# Patient Record
Sex: FEMALE | Race: WHITE
[De-identification: names, ages, dates, MRNs, and addresses within clinical notes are randomized per-mention and may not be internally consistent; named-entity substitution may affect disease eponyms.]

---

## 2018-12-06 ENCOUNTER — HOSPITAL ENCOUNTER (OUTPATIENT)
Dept: HOSPITAL 96 - M.RAD | Age: 63
End: 2018-12-06
Attending: GENERAL PRACTICE
Payer: OTHER GOVERNMENT

## 2018-12-06 DIAGNOSIS — Z12.31: Primary | ICD-10-CM

## 2019-02-05 ENCOUNTER — HOSPITAL ENCOUNTER (OUTPATIENT)
Dept: HOSPITAL 96 - M.ULTRA | Age: 64
End: 2019-02-05
Attending: GENERAL PRACTICE
Payer: OTHER GOVERNMENT

## 2019-02-05 DIAGNOSIS — R41.3: ICD-10-CM

## 2019-02-05 DIAGNOSIS — G98.8: Primary | ICD-10-CM

## 2020-07-29 ENCOUNTER — HOSPITAL ENCOUNTER (EMERGENCY)
Dept: HOSPITAL 96 - M.ERS | Age: 65
Discharge: HOME | End: 2020-07-29
Payer: OTHER GOVERNMENT

## 2020-07-29 VITALS — DIASTOLIC BLOOD PRESSURE: 49 MMHG | SYSTOLIC BLOOD PRESSURE: 88 MMHG

## 2020-07-29 VITALS — BODY MASS INDEX: 27.32 KG/M2 | WEIGHT: 160.01 LBS | HEIGHT: 64 IN

## 2020-07-29 DIAGNOSIS — Z87.891: ICD-10-CM

## 2020-07-29 DIAGNOSIS — M79.661: ICD-10-CM

## 2020-07-29 DIAGNOSIS — K21.9: ICD-10-CM

## 2020-07-29 DIAGNOSIS — Z88.8: ICD-10-CM

## 2020-07-29 DIAGNOSIS — G89.18: Primary | ICD-10-CM

## 2020-07-29 DIAGNOSIS — M79.89: ICD-10-CM

## 2020-07-29 DIAGNOSIS — Z90.710: ICD-10-CM

## 2021-04-26 ENCOUNTER — HOSPITAL ENCOUNTER (EMERGENCY)
Dept: HOSPITAL 96 - M.ERS | Age: 66
Discharge: HOME | End: 2021-04-26
Payer: OTHER GOVERNMENT

## 2021-04-26 VITALS — DIASTOLIC BLOOD PRESSURE: 68 MMHG | SYSTOLIC BLOOD PRESSURE: 120 MMHG

## 2021-04-26 VITALS — HEIGHT: 61 IN | WEIGHT: 160.01 LBS | BODY MASS INDEX: 30.21 KG/M2

## 2021-04-26 DIAGNOSIS — Z87.891: ICD-10-CM

## 2021-04-26 DIAGNOSIS — Z88.8: ICD-10-CM

## 2021-04-26 DIAGNOSIS — R22.0: Primary | ICD-10-CM

## 2021-04-26 DIAGNOSIS — Z90.710: ICD-10-CM

## 2021-04-26 DIAGNOSIS — T78.40XA: ICD-10-CM

## 2021-04-26 DIAGNOSIS — K21.9: ICD-10-CM
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
## 2021-05-07 ENCOUNTER — HOSPITAL ENCOUNTER (EMERGENCY)
Dept: HOSPITAL 96 - M.ERS | Age: 66
Discharge: HOME | End: 2021-05-07
Payer: OTHER GOVERNMENT

## 2021-05-07 VITALS — HEIGHT: 61 IN | BODY MASS INDEX: 30.21 KG/M2 | WEIGHT: 160.01 LBS

## 2021-05-07 VITALS — SYSTOLIC BLOOD PRESSURE: 124 MMHG | DIASTOLIC BLOOD PRESSURE: 65 MMHG

## 2021-05-07 DIAGNOSIS — Z87.891: ICD-10-CM

## 2021-05-07 DIAGNOSIS — Z90.710: ICD-10-CM

## 2021-05-07 DIAGNOSIS — K21.9: ICD-10-CM

## 2021-05-07 DIAGNOSIS — Z88.8: ICD-10-CM

## 2021-05-07 DIAGNOSIS — R20.8: ICD-10-CM

## 2021-05-07 DIAGNOSIS — R53.83: Primary | ICD-10-CM

## 2021-05-07 LAB
ABSOLUTE BASOPHILS: 0.1 THOU/UL (ref 0–0.2)
ABSOLUTE EOSINOPHILS: 0.1 THOU/UL (ref 0–0.7)
ABSOLUTE MONOCYTES: 0.7 THOU/UL (ref 0–1.2)
ALBUMIN SERPL-MCNC: 3.8 G/DL (ref 3.4–5)
ALP SERPL-CCNC: 54 U/L (ref 46–116)
ALT SERPL-CCNC: 40 U/L (ref 30–65)
ANION GAP SERPL CALC-SCNC: 11 MMOL/L (ref 7–16)
AST SERPL-CCNC: 22 U/L (ref 15–37)
BASOPHILS NFR BLD AUTO: 1.2 %
BILIRUB SERPL-MCNC: 0.3 MG/DL
BILIRUB UR-MCNC: NEGATIVE MG/DL
BUN SERPL-MCNC: 12 MG/DL (ref 7–18)
CALCIUM SERPL-MCNC: 8.8 MG/DL (ref 8.5–10.1)
CHLORIDE SERPL-SCNC: 103 MMOL/L (ref 98–107)
CO2 SERPL-SCNC: 23 MMOL/L (ref 21–32)
COLOR UR: YELLOW
CREAT SERPL-MCNC: 0.7 MG/DL (ref 0.6–1.3)
EOSINOPHIL NFR BLD: 1.8 %
GLUCOSE SERPL-MCNC: 86 MG/DL (ref 70–99)
GRANULOCYTES NFR BLD MANUAL: 64.1 %
HCT VFR BLD CALC: 39.4 % (ref 37–47)
HGB BLD-MCNC: 13.3 GM/DL (ref 12–15)
KETONES UR STRIP-MCNC: NEGATIVE MG/DL
LYMPHOCYTES # BLD: 1.5 THOU/UL (ref 0.8–5.3)
LYMPHOCYTES NFR BLD AUTO: 23 %
MCH RBC QN AUTO: 30.3 PG (ref 26–34)
MCHC RBC AUTO-ENTMCNC: 33.8 G/DL (ref 28–37)
MCV RBC: 89.6 FL (ref 80–100)
MONOCYTES NFR BLD: 9.9 %
MPV: 7.2 FL. (ref 7.2–11.1)
NEUTROPHILS # BLD: 4.2 THOU/UL (ref 1.6–8.1)
NUCLEATED RBCS: 0 /100WBC
PLATELET COUNT*: 228 THOU/UL (ref 150–400)
POTASSIUM SERPL-SCNC: 3.9 MMOL/L (ref 3.5–5.1)
PROT SERPL-MCNC: 7.1 G/DL (ref 6.4–8.2)
PROT UR QL STRIP: NEGATIVE
RBC # BLD AUTO: 4.4 MIL/UL (ref 4.2–5)
RBC # UR STRIP: NEGATIVE /UL
RDW-CV: 12.2 % (ref 10.5–14.5)
SODIUM SERPL-SCNC: 137 MMOL/L (ref 136–145)
SP GR UR STRIP: <= 1.005 (ref 1–1.03)
URINE CLARITY: CLEAR
URINE GLUCOSE-RANDOM: NEGATIVE
URINE LEUKOCYTES-REFLEX: NEGATIVE
URINE NITRITE-REFLEX: NEGATIVE
UROBILINOGEN UR STRIP-ACNC: 0.2 E.U./DL (ref 0.2–1)
WBC # BLD AUTO: 6.6 THOU/UL (ref 4–11)

## 2021-05-10 NOTE — EKG
[place] [address]
Phone:  [phone]                     ELECTROCARDIOGRAM REPORT      
_______________________________________________________________________________
 
Name:         ANIA RODRIGUEZ               Room:                     [facility]#:    [id number]     Account #:     [id number]  
Admission:    21    Attend Phys:                     
Discharge:    21    Date of Birth: [birth year]  
Date of Service: 21 1454  Report #:      [id number]
        [id number]
_______________________________________________________________________________
THIS REPORT FOR:  //name//                      
 
                         [facility] [place] ED
                                       
Test Date:    2021               Test Time:    14:54:58
Pat Name:     ANIA RODRIGUEZ            Department:   
Patient ID:   [id number]            Room:          
Gender:       F                        Technician:   
:          [id number]               Requested By: Jessica Mcdonough
Order Number: [id number] MD:   Eyad Houston
                                 Measurements
Intervals                              Axis          
Rate:         97                       P:            32
DC:           157                      QRS:          -11
QRSD:         93                       T:            32
QT:           357                                    
QTc:          454                                    
                           Interpretive Statements
Sinus rhythm
Low voltage, precordial leads
Compared to ECG 2015 08:03:08
Low QRS voltage now present
Electronically Signed On 5- 15:12:45 CDT by Eyad Houston
[email or web address]
 
 
 
 
 
 
 
 
 
 
 
 
 
 
 
 
 
 
 
 
  <ELECTRONICALLY SIGNED>
                                           By: Eyad Houston MD, [facility]     
  05/10/21     1512
D: 21 1454   _____________________________________
T: 21 1454   Eyad Houston MD, [facility]       /EPI

## 2021-07-06 ENCOUNTER — HOSPITAL ENCOUNTER (OUTPATIENT)
Dept: HOSPITAL 96 - M.RAD | Age: 66
End: 2021-07-06
Attending: FAMILY MEDICINE
Payer: OTHER GOVERNMENT

## 2021-07-06 DIAGNOSIS — Z12.31: Primary | ICD-10-CM

## 2021-07-06 DIAGNOSIS — N64.89: ICD-10-CM
